# Patient Record
Sex: MALE | Race: WHITE | NOT HISPANIC OR LATINO | URBAN - METROPOLITAN AREA
[De-identification: names, ages, dates, MRNs, and addresses within clinical notes are randomized per-mention and may not be internally consistent; named-entity substitution may affect disease eponyms.]

---

## 2020-07-25 ENCOUNTER — TELEPHONE ENCOUNTER (OUTPATIENT)
Dept: URBAN - METROPOLITAN AREA CLINIC 13 | Facility: CLINIC | Age: 25
End: 2020-07-25

## 2020-07-26 ENCOUNTER — TELEPHONE ENCOUNTER (OUTPATIENT)
Dept: URBAN - METROPOLITAN AREA CLINIC 13 | Facility: CLINIC | Age: 25
End: 2020-07-26

## 2020-12-15 PROBLEM — 60728008 BLOATING: Status: ACTIVE | Noted: 2020-12-15

## 2020-12-16 ENCOUNTER — OFFICE VISIT (OUTPATIENT)
Dept: URBAN - METROPOLITAN AREA CLINIC 113 | Facility: CLINIC | Age: 25
End: 2020-12-16

## 2020-12-16 NOTE — HPI-TODAY'S VISIT:
25-year-old male presenting for evaluation of bloating. He was last seen in October 2017 regarding constipation and abdominal pain.  He was to continue probiotic and IBgard.

## 2021-06-23 ENCOUNTER — DASHBOARD ENCOUNTERS (OUTPATIENT)
Age: 26
End: 2021-06-23

## 2021-06-23 ENCOUNTER — OFFICE VISIT (OUTPATIENT)
Dept: URBAN - METROPOLITAN AREA CLINIC 113 | Facility: CLINIC | Age: 26
End: 2021-06-23
Payer: COMMERCIAL

## 2021-06-23 VITALS
DIASTOLIC BLOOD PRESSURE: 75 MMHG | WEIGHT: 171 LBS | TEMPERATURE: 98.2 F | HEIGHT: 73 IN | BODY MASS INDEX: 22.66 KG/M2 | SYSTOLIC BLOOD PRESSURE: 119 MMHG | HEART RATE: 69 BPM

## 2021-06-23 DIAGNOSIS — K59.01 CONSTIPATION BY DELAYED COLONIC TRANSIT: ICD-10-CM

## 2021-06-23 DIAGNOSIS — R10.32 LLQ PAIN: ICD-10-CM

## 2021-06-23 PROCEDURE — 99203 OFFICE O/P NEW LOW 30 MIN: CPT | Performed by: INTERNAL MEDICINE

## 2021-06-23 RX ORDER — DICYCLOMINE HYDROCHLORIDE 10 MG/1
1 CAPSULES UNKNOWN DOSAGE CAPSULE ORAL ONCE DAILY
Status: ACTIVE | COMMUNITY

## 2021-06-23 RX ORDER — DICYCLOMINE HYDROCHLORIDE 10 MG/1
1 CAPSULES UNKNOWN DOSAGE CAPSULE ORAL THREE TIMES A DAY
Qty: 90 CAPSULE | Refills: 3 | OUTPATIENT

## 2021-06-23 RX ORDER — AMITRIPTYLINE HYDROCHLORIDE 10 MG/1
1 TABLET AT BEDTIME TABLET, FILM COATED ORAL ONCE A DAY
Qty: 30 | Refills: 3 | OUTPATIENT

## 2021-06-23 NOTE — HPI-TODAY'S VISIT:
24 yo male presenting for evaluation of abdominal pain.   He was last seen in the office in 2017 for constipation and abdominal pain. Constipation was improved with daily probiotic and IBgard as needed.  He presents today regarding multiple issues. He was evaluated by [facility], who wanted to do a colonoscopy, but then he was having scheduling issues. He complains of left lower abdominal discomfort characterized as tingling, that will then spread to his hands, feet, legs and face. When he has the tingling sensations, he may have difficulty with swallowing. There is regular heartburn, mostly associated with consuming red sauce, wine or beer. There is chronic nausea, especially after meals. He can obtain relief of nausea with forcing himself to vomit. He complains of significant pain and spasm like sensation associated with chills and sweating located in the LLQ. Bentyl does provide relief of the abdominal pain. He takes MIraLAX and fiber daily to help with bowel maintenance. He can go daily to every 3 days. No blood per rectum. There is no bloating. He reports having a CT scan in the ED that reportedly only showed constipation, otherwise normal. This was done at [facility] [place] around 5 months ago.

## 2021-06-24 PROBLEM — 35298007: Status: ACTIVE | Noted: 2021-06-23

## 2021-06-25 ENCOUNTER — TELEPHONE ENCOUNTER (OUTPATIENT)
Dept: URBAN - METROPOLITAN AREA CLINIC 113 | Facility: CLINIC | Age: 26
End: 2021-06-25

## 2021-07-02 ENCOUNTER — OFFICE VISIT (OUTPATIENT)
Dept: URBAN - METROPOLITAN AREA SURGERY CENTER 25 | Facility: SURGERY CENTER | Age: 26
End: 2021-07-02